# Patient Record
Sex: MALE | Race: BLACK OR AFRICAN AMERICAN | NOT HISPANIC OR LATINO | Employment: OTHER | URBAN - METROPOLITAN AREA
[De-identification: names, ages, dates, MRNs, and addresses within clinical notes are randomized per-mention and may not be internally consistent; named-entity substitution may affect disease eponyms.]

---

## 2017-02-01 PROBLEM — M25.551 PAIN OF BOTH HIP JOINTS: Status: ACTIVE | Noted: 2017-02-01

## 2017-02-01 PROBLEM — M79.89 FINGER SWELLING: Status: ACTIVE | Noted: 2017-02-01

## 2017-02-01 PROBLEM — M25.552 PAIN OF BOTH HIP JOINTS: Status: ACTIVE | Noted: 2017-02-01

## 2017-07-11 PROBLEM — G89.29 CHRONIC PAIN OF RIGHT KNEE: Status: ACTIVE | Noted: 2017-07-11

## 2017-07-11 PROBLEM — M25.561 CHRONIC PAIN OF RIGHT KNEE: Status: ACTIVE | Noted: 2017-07-11

## 2017-07-11 PROBLEM — M85.89 OSTEOPENIA OF MULTIPLE SITES: Status: ACTIVE | Noted: 2017-07-11

## 2017-08-17 PROBLEM — J45.30 MILD PERSISTENT ASTHMA: Status: ACTIVE | Noted: 2017-08-17

## 2017-08-24 PROBLEM — K04.7 PERIAPICAL ABSCESS: Status: ACTIVE | Noted: 2017-08-24

## 2017-08-24 PROBLEM — K03.5: Status: ACTIVE | Noted: 2017-08-24

## 2017-09-06 ENCOUNTER — HOSPITAL ENCOUNTER (OUTPATIENT)
Dept: RADIOLOGY | Facility: HOSPITAL | Age: 52
Discharge: HOME | End: 2017-09-06
Attending: INTERNAL MEDICINE
Payer: MEDICAID

## 2017-09-06 DIAGNOSIS — D86.9 SARCOID: ICD-10-CM

## 2017-09-14 ENCOUNTER — HOSPITAL ENCOUNTER (OUTPATIENT)
Dept: RADIOLOGY | Facility: HOSPITAL | Age: 52
Discharge: HOME | End: 2017-09-14
Attending: INTERNAL MEDICINE
Payer: MEDICAID

## 2017-09-14 PROCEDURE — 71250 CT THORAX DX C-: CPT | Mod: TC

## 2017-09-14 PROCEDURE — 71250 CT THORAX DX C-: CPT | Mod: 26,,, | Performed by: RADIOLOGY

## 2018-08-21 ENCOUNTER — NURSE TRIAGE (OUTPATIENT)
Dept: ADMINISTRATIVE | Facility: CLINIC | Age: 53
End: 2018-08-21

## 2018-08-21 NOTE — TELEPHONE ENCOUNTER
Reason for Disposition   General information question, no triage required and triager able to answer question    Protocols used: ST INFORMATION ONLY CALL-A-JOLEEN Pettit wondering what Dr. Delgado's office was calling him about. Advised no notes seen in chart but he does have an appt scheduled for Thursday 8/23 at 240 pm. He has no further questions. Please contact caller with any further care advice.

## 2020-01-15 PROBLEM — M25.562 CHRONIC PAIN OF LEFT KNEE: Status: ACTIVE | Noted: 2020-01-15

## 2020-01-15 PROBLEM — G89.29 CHRONIC PAIN OF LEFT KNEE: Status: ACTIVE | Noted: 2020-01-15

## 2021-01-04 PROBLEM — G89.29 ELBOW PAIN, CHRONIC, RIGHT: Status: ACTIVE | Noted: 2021-01-04

## 2021-01-04 PROBLEM — M25.511 CHRONIC RIGHT SHOULDER PAIN: Status: ACTIVE | Noted: 2021-01-04

## 2021-01-04 PROBLEM — M25.521 ELBOW PAIN, CHRONIC, RIGHT: Status: ACTIVE | Noted: 2021-01-04

## 2021-01-04 PROBLEM — G89.29 CHRONIC RIGHT SHOULDER PAIN: Status: ACTIVE | Noted: 2021-01-04

## 2021-04-26 PROBLEM — H10.13 ALLERGIC CONJUNCTIVITIS OF BOTH EYES: Status: ACTIVE | Noted: 2021-04-26

## 2021-11-19 PROBLEM — M75.41 SUBACROMIAL IMPINGEMENT OF RIGHT SHOULDER: Status: ACTIVE | Noted: 2021-11-19

## 2021-12-28 PROBLEM — Z78.9 IMPAIRED INSTRUMENTAL ACTIVITIES OF DAILY LIVING: Status: ACTIVE | Noted: 2021-12-28

## 2021-12-28 PROBLEM — R53.1 WEAKNESS: Status: ACTIVE | Noted: 2021-12-28

## 2021-12-28 PROBLEM — M25.611 DECREASED RANGE OF MOTION OF RIGHT SHOULDER: Status: ACTIVE | Noted: 2021-12-28

## 2023-02-17 PROBLEM — H25.812 COMBINED FORM OF AGE-RELATED CATARACT, LEFT EYE: Status: ACTIVE | Noted: 2023-02-17

## 2023-02-17 PROBLEM — H40.013 OPEN ANGLE WITH BORDERLINE FINDINGS AND LOW GLAUCOMA RISK IN BOTH EYES: Status: RESOLVED | Noted: 2023-02-17 | Resolved: 2023-02-17

## 2023-02-17 PROBLEM — H40.013 OPEN ANGLE WITH BORDERLINE FINDINGS AND LOW GLAUCOMA RISK IN BOTH EYES: Status: ACTIVE | Noted: 2023-02-17

## 2023-02-17 PROBLEM — H40.1121 PRIMARY OPEN ANGLE GLAUCOMA (POAG) OF LEFT EYE, MILD STAGE: Status: ACTIVE | Noted: 2023-02-17

## 2023-03-23 PROBLEM — G89.29 CHRONIC LEFT SHOULDER PAIN: Status: ACTIVE | Noted: 2023-03-23

## 2023-03-23 PROBLEM — M25.512 CHRONIC LEFT SHOULDER PAIN: Status: ACTIVE | Noted: 2023-03-23

## 2023-03-23 PROBLEM — M25.612 DECREASED RANGE OF MOTION OF SHOULDER, LEFT: Status: ACTIVE | Noted: 2021-12-28

## 2023-04-18 PROBLEM — N39.43 POST-VOID DRIBBLING: Status: ACTIVE | Noted: 2023-04-18

## 2023-08-17 PROBLEM — M75.122 COMPLETE ROTATOR CUFF TEAR OF LEFT SHOULDER: Status: ACTIVE | Noted: 2023-08-17

## 2023-10-19 PROBLEM — M25.512 CHRONIC LEFT SHOULDER PAIN: Status: RESOLVED | Noted: 2023-03-23 | Resolved: 2023-10-19

## 2023-10-19 PROBLEM — G89.29 CHRONIC LEFT SHOULDER PAIN: Status: RESOLVED | Noted: 2023-03-23 | Resolved: 2023-10-19

## 2024-01-03 ENCOUNTER — PATIENT OUTREACH (OUTPATIENT)
Dept: ADMINISTRATIVE | Facility: HOSPITAL | Age: 59
End: 2024-01-03

## 2024-01-12 PROBLEM — Z12.11 ENCOUNTER FOR SCREENING COLONOSCOPY: Status: ACTIVE | Noted: 2024-01-12

## 2024-07-25 PROBLEM — M75.42 SUBACROMIAL IMPINGEMENT OF LEFT SHOULDER: Status: ACTIVE | Noted: 2021-11-19

## 2024-07-25 PROBLEM — M25.812 OS ACROMIALE OF LEFT SHOULDER: Status: ACTIVE | Noted: 2024-07-25

## 2025-04-07 PROBLEM — M75.122 NONTRAUMATIC COMPLETE TEAR OF LEFT ROTATOR CUFF: Status: RESOLVED | Noted: 2023-08-17 | Resolved: 2025-04-07

## 2025-04-07 PROBLEM — M17.0 OSTEOARTHRITIS OF BOTH KNEES: Status: ACTIVE | Noted: 2025-04-07

## 2025-04-08 PROBLEM — M25.612 DECREASED ROM OF LEFT SHOULDER: Status: ACTIVE | Noted: 2025-04-08

## 2025-04-08 PROBLEM — R29.898 LEFT ARM WEAKNESS: Status: ACTIVE | Noted: 2025-04-08

## 2025-07-09 DIAGNOSIS — U07.1 COVID-19 VIRUS DETECTED: ICD-10-CM
